# Patient Record
Sex: FEMALE | Race: WHITE
[De-identification: names, ages, dates, MRNs, and addresses within clinical notes are randomized per-mention and may not be internally consistent; named-entity substitution may affect disease eponyms.]

---

## 2019-10-23 ENCOUNTER — HOSPITAL ENCOUNTER (OUTPATIENT)
Dept: HOSPITAL 92 - ULT | Age: 24
Discharge: HOME | End: 2019-10-23
Attending: INTERNAL MEDICINE
Payer: COMMERCIAL

## 2019-10-23 DIAGNOSIS — R80.9: Primary | ICD-10-CM

## 2019-10-23 PROCEDURE — 76770 US EXAM ABDO BACK WALL COMP: CPT

## 2019-10-23 NOTE — ULT
Exam: Bilateral renal ultrasound



HISTORY: Proteinuria



COMPARISON: None





FINDINGS: 

Right kidney: Normal cortical echotexture. No hydronephrosis.

Right kidney measurements: 9.8 x 4.0 x 4.6 cm. 

Left kidney: Normal cortical echotexture. No hydronephrosis

Left kidney measurements 4.2 x 10.0 x 4.6 cm. 



Urinary bladder: Limited evaluation due to inadequate distention. Grossly no abnormality.





IMPRESSION: No hydronephrosis.



Reported By: Gibson Golden 

Electronically Signed:  10/23/2019 3:38 PM